# Patient Record
Sex: MALE | Race: OTHER | HISPANIC OR LATINO | URBAN - METROPOLITAN AREA
[De-identification: names, ages, dates, MRNs, and addresses within clinical notes are randomized per-mention and may not be internally consistent; named-entity substitution may affect disease eponyms.]

---

## 2019-01-08 ENCOUNTER — EMERGENCY (EMERGENCY)
Facility: HOSPITAL | Age: 64
LOS: 1 days | Discharge: HOME | End: 2019-01-08
Attending: EMERGENCY MEDICINE
Payer: MEDICAID

## 2019-01-08 VITALS
SYSTOLIC BLOOD PRESSURE: 151 MMHG | DIASTOLIC BLOOD PRESSURE: 89 MMHG | RESPIRATION RATE: 16 BRPM | HEART RATE: 83 BPM | TEMPERATURE: 98 F | OXYGEN SATURATION: 96 %

## 2019-01-08 VITALS
HEART RATE: 63 BPM | DIASTOLIC BLOOD PRESSURE: 75 MMHG | SYSTOLIC BLOOD PRESSURE: 105 MMHG | RESPIRATION RATE: 18 BRPM | TEMPERATURE: 98 F | OXYGEN SATURATION: 98 %

## 2019-01-08 LAB
ALBUMIN SERPL ELPH-MCNC: 4 G/DL — SIGNIFICANT CHANGE UP (ref 3.5–5)
ALP SERPL-CCNC: 70 U/L — SIGNIFICANT CHANGE UP (ref 40–120)
ALT FLD-CCNC: 20 U/L DA — SIGNIFICANT CHANGE UP (ref 10–60)
ANION GAP SERPL CALC-SCNC: 8 MMOL/L — SIGNIFICANT CHANGE UP (ref 5–17)
APPEARANCE UR: CLEAR — SIGNIFICANT CHANGE UP
AST SERPL-CCNC: 17 U/L — SIGNIFICANT CHANGE UP (ref 10–40)
BASOPHILS # BLD AUTO: 0.1 K/UL — SIGNIFICANT CHANGE UP (ref 0–0.2)
BASOPHILS NFR BLD AUTO: 1.2 % — SIGNIFICANT CHANGE UP (ref 0–2)
BILIRUB SERPL-MCNC: 0.3 MG/DL — SIGNIFICANT CHANGE UP (ref 0.2–1.2)
BILIRUB UR-MCNC: NEGATIVE — SIGNIFICANT CHANGE UP
BUN SERPL-MCNC: 12 MG/DL — SIGNIFICANT CHANGE UP (ref 7–18)
CALCIUM SERPL-MCNC: 8.5 MG/DL — SIGNIFICANT CHANGE UP (ref 8.4–10.5)
CHLORIDE SERPL-SCNC: 108 MMOL/L — SIGNIFICANT CHANGE UP (ref 96–108)
CO2 SERPL-SCNC: 25 MMOL/L — SIGNIFICANT CHANGE UP (ref 22–31)
COLOR SPEC: YELLOW — SIGNIFICANT CHANGE UP
CREAT SERPL-MCNC: 0.84 MG/DL — SIGNIFICANT CHANGE UP (ref 0.5–1.3)
DIFF PNL FLD: NEGATIVE — SIGNIFICANT CHANGE UP
EOSINOPHIL # BLD AUTO: 0.1 K/UL — SIGNIFICANT CHANGE UP (ref 0–0.5)
EOSINOPHIL NFR BLD AUTO: 1 % — SIGNIFICANT CHANGE UP (ref 0–6)
GLUCOSE SERPL-MCNC: 103 MG/DL — HIGH (ref 70–99)
GLUCOSE UR QL: NEGATIVE — SIGNIFICANT CHANGE UP
HCT VFR BLD CALC: 45.6 % — SIGNIFICANT CHANGE UP (ref 39–50)
HGB BLD-MCNC: 14.5 G/DL — SIGNIFICANT CHANGE UP (ref 13–17)
KETONES UR-MCNC: NEGATIVE — SIGNIFICANT CHANGE UP
LEUKOCYTE ESTERASE UR-ACNC: ABNORMAL
LYMPHOCYTES # BLD AUTO: 1.5 K/UL — SIGNIFICANT CHANGE UP (ref 1–3.3)
LYMPHOCYTES # BLD AUTO: 18.5 % — SIGNIFICANT CHANGE UP (ref 13–44)
MCHC RBC-ENTMCNC: 30.3 PG — SIGNIFICANT CHANGE UP (ref 27–34)
MCHC RBC-ENTMCNC: 31.8 GM/DL — LOW (ref 32–36)
MCV RBC AUTO: 95.3 FL — SIGNIFICANT CHANGE UP (ref 80–100)
MONOCYTES # BLD AUTO: 0.6 K/UL — SIGNIFICANT CHANGE UP (ref 0–0.9)
MONOCYTES NFR BLD AUTO: 7.7 % — SIGNIFICANT CHANGE UP (ref 2–14)
NEUTROPHILS # BLD AUTO: 6 K/UL — SIGNIFICANT CHANGE UP (ref 1.8–7.4)
NEUTROPHILS NFR BLD AUTO: 71.6 % — SIGNIFICANT CHANGE UP (ref 43–77)
NITRITE UR-MCNC: NEGATIVE — SIGNIFICANT CHANGE UP
PH UR: 6 — SIGNIFICANT CHANGE UP (ref 5–8)
PLATELET # BLD AUTO: 262 K/UL — SIGNIFICANT CHANGE UP (ref 150–400)
POTASSIUM SERPL-MCNC: 3.6 MMOL/L — SIGNIFICANT CHANGE UP (ref 3.5–5.3)
POTASSIUM SERPL-SCNC: 3.6 MMOL/L — SIGNIFICANT CHANGE UP (ref 3.5–5.3)
PROT SERPL-MCNC: 7.5 G/DL — SIGNIFICANT CHANGE UP (ref 6–8.3)
PROT UR-MCNC: NEGATIVE — SIGNIFICANT CHANGE UP
RBC # BLD: 4.79 M/UL — SIGNIFICANT CHANGE UP (ref 4.2–5.8)
RBC # FLD: 12.9 % — SIGNIFICANT CHANGE UP (ref 10.3–14.5)
SODIUM SERPL-SCNC: 141 MMOL/L — SIGNIFICANT CHANGE UP (ref 135–145)
SP GR SPEC: 1.01 — SIGNIFICANT CHANGE UP (ref 1.01–1.02)
TROPONIN I SERPL-MCNC: 0.04 NG/ML — SIGNIFICANT CHANGE UP (ref 0–0.04)
UROBILINOGEN FLD QL: NEGATIVE — SIGNIFICANT CHANGE UP
WBC # BLD: 8.3 K/UL — SIGNIFICANT CHANGE UP (ref 3.8–10.5)
WBC # FLD AUTO: 8.3 K/UL — SIGNIFICANT CHANGE UP (ref 3.8–10.5)

## 2019-01-08 PROCEDURE — 71046 X-RAY EXAM CHEST 2 VIEWS: CPT | Mod: 26

## 2019-01-08 PROCEDURE — 84484 ASSAY OF TROPONIN QUANT: CPT

## 2019-01-08 PROCEDURE — 80053 COMPREHEN METABOLIC PANEL: CPT

## 2019-01-08 PROCEDURE — 81001 URINALYSIS AUTO W/SCOPE: CPT

## 2019-01-08 PROCEDURE — 71046 X-RAY EXAM CHEST 2 VIEWS: CPT

## 2019-01-08 PROCEDURE — 99285 EMERGENCY DEPT VISIT HI MDM: CPT

## 2019-01-08 PROCEDURE — 99284 EMERGENCY DEPT VISIT MOD MDM: CPT | Mod: 25

## 2019-01-08 PROCEDURE — 85027 COMPLETE CBC AUTOMATED: CPT

## 2019-01-08 PROCEDURE — 93005 ELECTROCARDIOGRAM TRACING: CPT

## 2019-01-08 PROCEDURE — 36415 COLL VENOUS BLD VENIPUNCTURE: CPT

## 2019-01-08 PROCEDURE — 96365 THER/PROPH/DIAG IV INF INIT: CPT

## 2019-01-08 PROCEDURE — 96361 HYDRATE IV INFUSION ADD-ON: CPT

## 2019-01-08 RX ORDER — MECLIZINE HCL 12.5 MG
25 TABLET ORAL ONCE
Qty: 0 | Refills: 0 | Status: COMPLETED | OUTPATIENT
Start: 2019-01-08 | End: 2019-01-08

## 2019-01-08 RX ORDER — ACETAMINOPHEN 500 MG
1000 TABLET ORAL ONCE
Qty: 0 | Refills: 0 | Status: COMPLETED | OUTPATIENT
Start: 2019-01-08 | End: 2019-01-08

## 2019-01-08 RX ORDER — SODIUM CHLORIDE 9 MG/ML
1000 INJECTION INTRAMUSCULAR; INTRAVENOUS; SUBCUTANEOUS ONCE
Qty: 0 | Refills: 0 | Status: COMPLETED | OUTPATIENT
Start: 2019-01-08 | End: 2019-01-08

## 2019-01-08 RX ADMIN — SODIUM CHLORIDE 1000 MILLILITER(S): 9 INJECTION INTRAMUSCULAR; INTRAVENOUS; SUBCUTANEOUS at 15:45

## 2019-01-08 RX ADMIN — Medication 400 MILLIGRAM(S): at 15:45

## 2019-01-08 RX ADMIN — Medication 1000 MILLIGRAM(S): at 16:15

## 2019-01-08 RX ADMIN — Medication 25 MILLIGRAM(S): at 15:46

## 2019-01-08 RX ADMIN — SODIUM CHLORIDE 1000 MILLILITER(S): 9 INJECTION INTRAMUSCULAR; INTRAVENOUS; SUBCUTANEOUS at 16:28

## 2019-01-08 NOTE — ED PROVIDER NOTE - OBJECTIVE STATEMENT
64 y/o M patient with a significant PMHx of Depression and no significant PSHx presents to the ED with left sided chest pain. Patient describes chest pain as a squeezing sensation and radiates to the left side of the chest. Patient states his chest pain has been present for the last x3 weeks and states his chest pain is intermittent and worse today. Patient endorses occasionally having SOB when walking over the past x3 weeks. Patient says his chest pain is only associated with vertigo. Patient notes a history of vertigo and doesn't take any meds. Patient denies nausea, vomiting, diaphoresis, abdominal pain, lower extremity swelling and any other complaints. Patient states he hasn't taken any medications to alleviate Sx. Patient denies a history of HTN, HLD, or DM. Patient is predominantly Cape Verdean speaking and declined a phone translation. Patient wanted friend to translate. NKDA.

## 2019-01-08 NOTE — ED ADULT NURSE NOTE - OBJECTIVE STATEMENT
pt complains of 6/10 chest pain and right eye twitching. pt denies n/v. pt is able to speak in full sentences and says pain is better. pt is stable.

## 2019-01-08 NOTE — ED PROVIDER NOTE - PROGRESS NOTE DETAILS
patient reassessed. Pain improved. Workup negative. Requesting discharge. Patient will follow with primary doctor for further evaluation.

## 2023-03-16 NOTE — ED PROVIDER NOTE - CROS ED CARDIOVAS ALL NEG
[MRI] : MRI [Cervical Spine] : cervical spine [Report was reviewed and noted in the chart] : The report was reviewed and noted in the chart [I independently reviewed and interpreted images and report] : I independently reviewed and interpreted images and report [FreeTextEntry1] : MRI Cervical Spine (1/11/2023)\par FINDINGS:\par Examination is limited due to motion.\par \par The normal cervical lordosis is preserved.\par \par Structures at the craniocervical and cervicomedullary junction are intact.\par \par The cervical vertebral body heights and alignment are maintained. The \par bone marrow signal is within normal limits without edema. No destructive \par bony lesion.\par \par The spinal cord is unremarkable. There is high-grade spinal stenosis at \par C2-C3 with mass effect upon the spinal cord in the right paracentral \par region without abnormal spinal cord signal.\par \par The cervical intervertebral disc heights and signal intensity are \par preserved.\par \par C2-C3: There is prominent right paracentral disc osteophyte complex \par resulting in severe spinal stenosis with mass effect upon the spinal \par cord. There is no abnormal spinal cord signal.\par \par C3-C4: Disc osteophyte complex as well as bilateral uncinate spurring and \par facet arthrosis contributing to severe spinal stenosis as well as mild \par bilateral neuroforaminal narrowing\par \par C4-C5: There is disc bulging as well as bilateral facet hypertrophy and \par facet arthrosis contributing to moderate spinal stenosis.\par \par C5-C6: There is disc bulging as well as bilateral uncinate hypertrophy \par resulting in mild bilateral neuroforaminal narrowing and mild spinal \par stenosis.\par \par C6-C7: There is disc osteophyte complex, bilateral uncinate hypertrophy \par without spinal canal or neuroforaminal narrowing.\par \par C7-T1: There is disc osteophyte complex with bilateral uncinate \par hypertrophy and facet arthrosis contributing to mild bilateral \par neuroforaminal narrowing.\par \par T1-T2: Partially visualized disc osteophyte complex resulting in moderate \par to severe spinal stenosis.\par \par There is no soft tissue neck mass or fluid collection.\par \par IMPRESSION:\par Examination is limited due to motion.\par \par Right paracentral disc osteophyte complex at C2-C3 causing mass effect \par upon the spinal cord without abnormal spinal cord signal.\par \par Multilevel moderate to severe spinal canal stenosis on a congenital basis \par in concert with degenerative changes. \par  - - -

## 2024-04-20 ENCOUNTER — EMERGENCY (EMERGENCY)
Facility: HOSPITAL | Age: 69
LOS: 1 days | Discharge: HOME | End: 2024-04-20
Attending: STUDENT IN AN ORGANIZED HEALTH CARE EDUCATION/TRAINING PROGRAM | Admitting: STUDENT IN AN ORGANIZED HEALTH CARE EDUCATION/TRAINING PROGRAM
Payer: COMMERCIAL

## 2024-04-20 VITALS
TEMPERATURE: 99 F | HEIGHT: 62 IN | RESPIRATION RATE: 20 BRPM | OXYGEN SATURATION: 95 % | SYSTOLIC BLOOD PRESSURE: 133 MMHG | HEART RATE: 80 BPM | DIASTOLIC BLOOD PRESSURE: 83 MMHG | WEIGHT: 160.06 LBS

## 2024-04-20 DIAGNOSIS — R20.2 PARESTHESIA OF SKIN: ICD-10-CM

## 2024-04-20 DIAGNOSIS — M54.9 DORSALGIA, UNSPECIFIED: ICD-10-CM

## 2024-04-20 DIAGNOSIS — F32.A DEPRESSION, UNSPECIFIED: ICD-10-CM

## 2024-04-20 PROBLEM — F32.9 MAJOR DEPRESSIVE DISORDER, SINGLE EPISODE, UNSPECIFIED: Chronic | Status: ACTIVE | Noted: 2019-01-08

## 2024-04-20 PROCEDURE — 96372 THER/PROPH/DIAG INJ SC/IM: CPT

## 2024-04-20 PROCEDURE — 99284 EMERGENCY DEPT VISIT MOD MDM: CPT

## 2024-04-20 PROCEDURE — 99283 EMERGENCY DEPT VISIT LOW MDM: CPT | Mod: 25

## 2024-04-20 RX ORDER — KETOROLAC TROMETHAMINE 30 MG/ML
15 SYRINGE (ML) INJECTION ONCE
Refills: 0 | Status: DISCONTINUED | OUTPATIENT
Start: 2024-04-20 | End: 2024-04-20

## 2024-04-20 RX ORDER — GABAPENTIN 400 MG/1
1 CAPSULE ORAL
Qty: 60 | Refills: 0
Start: 2024-04-20 | End: 2024-05-19

## 2024-04-20 RX ADMIN — Medication 15 MILLIGRAM(S): at 15:13

## 2024-04-20 NOTE — ED PROVIDER NOTE - OBJECTIVE STATEMENT
As per patient, family member, with  [id number], 68M with depression, now with 1.5 weeks of back pain radiating down legs, associated with tingling in legs, suspected start with patient lifting family member.  No groin or lower extremity numbness.  No fevers or hx IVDU.  No cancer history.  No bowel/bladder incontinence.  Able to walk with cane/arm brace, but with pain.

## 2024-04-20 NOTE — ED ADULT TRIAGE NOTE - CHIEF COMPLAINT QUOTE
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  Patient denies loss of bowel or bladder function.

## 2024-04-20 NOTE — ED ADULT NURSE NOTE - OBJECTIVE STATEMENT
The patient is a 68y Male complaining of back pain/injury. Primarily Pashto speaker pt, reports intermittent bl lower back pain radiating to bl LE x 2 weeks. 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 Pt describes pain as "electric shock going down my legs", also endorses numbness and mild swelling to bl LE. +Pulses. Pt denies falls, urinary symptoms, loss of bowel/bladder control, CP, SOB, dizziness, F/C, N/V/D.

## 2024-04-20 NOTE — ED ADULT NURSE NOTE - NSFALLHARMRISKINTERV_ED_ALL_ED

## 2024-04-20 NOTE — ED PROVIDER NOTE - PATIENT PORTAL LINK FT
You can access the FollowMyHealth Patient Portal offered by [facility] by registering at the following website: [email or web address]. By joining [employer]’s FollowMyHealth portal, you will also be able to view your health information using other applications (apps) compatible with our system.

## 2024-04-20 NOTE — ED PROVIDER NOTE - NSFOLLOWUPCLINICS_GEN_ALL_ED_FT
[facility]  Family Medicine  [address]  [place], [place] [address]  Phone: [phone]  Fax:

## 2024-04-20 NOTE — ED PROVIDER NOTE - CLINICAL SUMMARY MEDICAL DECISION MAKING FREE TEXT BOX
Back pain radiating down legs most consistent with lumbar radiculopathy, likely 2/2 musculoskeletal trigger in the setting of lifting.  Patient currently taking tylenol, will also add gabapentin to regimen.  No history or exam findings concerning for current or impending spinal cord compromise.  Encouraged patient to follow up with PCP.  Plan to discharge home with return precautions and outpatient followup.

## 2024-04-20 NOTE — ED PROVIDER NOTE - PHYSICAL EXAMINATION
General: comfortable, resting in ED  HEENT: atraumatic, no eye erythema or discharge  Pulm: no cyanosis, no added work of breathing  Cardiac: no pallor, intact peripheral pulse  GI: no abdominal distension  Neuro: alert, conversant, intact sensation bilateral lower extremities, 5/5 L ankle plantarflexion, 4/5 R ankle plantarflexion  Psych: neutral affect, cooperative  Msk: no gross deformity or instability  Skin: no erythema or rash

## 2024-04-20 NOTE — ED PROVIDER NOTE - NSFOLLOWUPINSTRUCTIONS_ED_ALL_ED_FT
Ciática  Sciatica  Back view of a person showing a normal leg and a leg affected by the pain of sciatica.  La ciática es el dolor, entumecimiento, debilidad u hormigueo a lo lakeshia del nervio ciático. El nervio ciático comienza en la parte inferior de la espalda y desciende por la parte posterior de cada pierna. Controla los músculos en la parte inferior de las piernas y en la parte posterior de las rodillas. También proporciona sensibilidad a la parte posterior de los muslos, la parte inferior de las piernas y la planta de los pies. La ciática es un síntoma de otra afección que ejerce presión o “pellizca” el nervio ciático.    Con mayor frecuencia, la ciática afecta a un solo lado del cuerpo. Suele desaparecer por sí fabricio o con tratamiento. En algunos casos, la ciática puede volver (ser recurrente).    ¿Cuáles son las causas?  Esta afección es causada por palma presión sobre el nervio ciático o “pellizco” del nervio ciático. Cleary puede ser el resultado de:  Un disco que sobresale demasiado entre los huesos de la columna vertebral (hernia de disco).  Cambios en los discos vertebrales relacionados con la edad.  Un trastorno doloroso que afecta un músculo de las nalgas.  Un crecimiento óseo adicional cerca del nervio ciático.  Aplma rotura (fractura) de la pelvis.  Embarazo.  Tumor. Cleary es poco frecuente.  ¿Qué incrementa el riesgo?  Los siguientes factores pueden hacer que sea más propenso a desarrollar esta afección:  Practicar deportes que ponen presión o tensión sobre la columna vertebral.  Tener poca fuerza y flexibilidad.  Antecedentes de cirugía o lesión en la espalda.  Estar sentado snehal largos períodos de tiempo.  Realizar actividades que requieren agacharse o levantar objetos en forma repetida.  Obesidad.  ¿Cuáles son los signos o síntomas?  Los síntomas pueden variar de leves a muy graves. Pueden incluir los siguientes:  Cualquiera de los siguientes problemas en la parte inferior de la espalda, las piernas, la cadera o las nalgas:  Hormigueo leve, adormecimiento o dolor sordo.  Sensación de ardor.  Dolor tristin.  Adormecimiento de la parte posterior de la pantorrilla o la planta del pie.  Debilidad en las piernas.  Dolor de espalda intenso que dificulta el movimiento.  Los síntomas podrían empeorar al toser, estornudar o reírse, o cuando se está sentado o de pie snehal períodos prolongados.    ¿Cómo se diagnostica?  Esta afección se puede diagnosticar en función de lo siguiente:  Los síntomas y los antecedentes médicos.  Un examen físico.  Pruebas de huey.  Pruebas de diagnóstico por imágenes, por ejemplo:  Radiografías.  Palma resonancia magnética (RM).  Palma exploración por tomografía computarizada (TC).  ¿Cómo se trata?  En muchos casos, esta afección mejora por sí fabricio, sin tratamiento. Sin embargo, el tratamiento puede incluir:  Reducción o modificación la actividad física.  Hacer ejercicio, incluido el fortalecimiento y la elongación.  Aplicación de calor o hielo en la james afectada.  Medicamentos para lo siguiente:  Aliviar el dolor y la inflamación.  Relajan los músculos.  Medicamentos inyectables que ayudan a aliviar el dolor y la inflamación (corticoesteroides) alrededor del nervio ciático.  Cirugía.  Siga estas instrucciones en andrew casa:  Medicamentos    Use los medicamentos de venta morena y los recetados solamente keon se lo haya indicado el médico.  Pregúntele al médico si el medicamento recetado le impide conducir o usar maquinaria pesada.  Control del dolor    Bag of ice on a towel on the skin.  A heating pad for use on the affected area.  Si se lo indican, aplique hielo sobre la james afectada. Para hacer esto:  Ponga el hielo en palma bolsa plástica.  Coloque palma toalla entre la piel y la bolsa.  Aplique el hielo snehal 20 minutos, 2 a 3 veces por día.  Si la piel se le pone de color sommers brillante, retire el hielo de inmediato para evitar daños en la piel. El riesgo de daño en la piel es mayor si no puede sentir dolor, calor o frío.  Si se lo indican, aplique calor en la james afectada con la frecuencia que le haya indicado el médico. Use la meena de calor que el médico le recomiende, keon palma compresa de calor húmedo o palma almohadilla térmica.  Coloque palma toalla entre la piel y la meena de calor.  Aplique calor snehal 20 a 30 minutos.  Si la piel se le pone de color sommers brillante, retire el calor de inmediato para evitar quemaduras. El riesgo de quemaduras es mayor si no puede sentir el dolor, el calor o el frío.  Actividad    A comparison showing the right and wrong way to lift a heavy object.  Retome tim actividades normales keon se lo haya indicado el médico. Pregúntele al médico qué actividades son seguras para usted.  Evite las actividades que empeoran los síntomas.  Snehal el día, descanse snehal lapsos breves.  Cuando descanse snehal períodos más largos, incorpore alguna actividad suave o ejercicios de elongación entre los períodos de descanso. Cleary ayudará a evitar la rigidez y el dolor.  Evite estar sentado snehal largos períodos de tiempo sin moverse. Levántese y muévase al menos palma vez cada hora.  Marlin ejercicio y elongue con regularidad, keon se lo haya indicado el médico.  No levante objetos que pesen más de 10 libras (4.5 kg) hasta que el médico le diga que es seguro. Aunque no tenga síntomas, evite levantar objetos pesados, en especial en forma repetida.  Siempre use las técnicas de levantamiento correctas para levantar objetos, entre ellas:  Flexionar las rodillas.  Mantener la carga cerca del cuerpo.  No torcerse.  Instrucciones generales    Mantenga un peso saludable. El exceso de peso ejerce tensión adicional sobre la espalda.  Use calzado con buen apoyo y cómodo. Evite usar tacones.  Evite dormir sobre un colchón que sea demasiado blando o demasiado kennedi. Un colchón que ofrezca un apoyo suficientemente firme para andrew espalda al dormir puede ayudar a aliviar el dolor.  Comuníquese con un médico si:  El dolor no se christina con los medicamentos.  El dolor no mejora o empeora.  Los síntomas good más de 4 semanas.  Baja de peso sin causa aparente.  Solicite ayuda de inmediato si:  No puede controlar cuándo orinar o defecar (incontinencia).  Tiene lo siguiente:  Debilidad que empeora en la parte inferior de la espalda, la pelvis, las nalgas o las piernas.  Enrojecimiento o inflamación en la espalda.  Sensación de ardor al orinar.  Resumen  La ciática es dolor, entumecimiento, debilidad u hormigueo a lo lakeshia del trayecto del nervio ciático, que puede incluir la parte inferior de la espalda, las piernas, las caderas y las nalgas.  Esta afección es causada por palma presión sobre el nervio ciático o “pellizco” del nervio ciático.  El tratamiento a menudo incluye reposo, ejercicios, medicamentos y aplicar hielo o calor.  Esta información no tiene keon fin reemplazar el consejo del médico. Asegúrese de hacerle al médico cualquier pregunta que tenga.